# Patient Record
Sex: FEMALE | Race: WHITE | NOT HISPANIC OR LATINO | Employment: UNEMPLOYED | URBAN - METROPOLITAN AREA
[De-identification: names, ages, dates, MRNs, and addresses within clinical notes are randomized per-mention and may not be internally consistent; named-entity substitution may affect disease eponyms.]

---

## 2020-01-22 ENCOUNTER — ANESTHESIA (OUTPATIENT)
Dept: GASTROENTEROLOGY | Facility: HOSPITAL | Age: 55
End: 2020-01-22

## 2020-01-22 ENCOUNTER — ANESTHESIA EVENT (OUTPATIENT)
Dept: GASTROENTEROLOGY | Facility: HOSPITAL | Age: 55
End: 2020-01-22

## 2020-01-22 ENCOUNTER — HOSPITAL ENCOUNTER (EMERGENCY)
Facility: HOSPITAL | Age: 55
Discharge: HOME | End: 2020-01-23
Attending: EMERGENCY MEDICINE | Admitting: INTERNAL MEDICINE

## 2020-01-22 DIAGNOSIS — T18.128A ESOPHAGEAL OBSTRUCTION DUE TO FOOD IMPACTION: Primary | ICD-10-CM

## 2020-01-22 DIAGNOSIS — K22.2 ESOPHAGEAL OBSTRUCTION DUE TO FOOD IMPACTION: Primary | ICD-10-CM

## 2020-01-22 DIAGNOSIS — K22.2 ESOPHAGEAL STRICTURE: ICD-10-CM

## 2020-01-22 PROCEDURE — 25010000002 PROPOFOL 10 MG/ML EMULSION: Performed by: ANESTHESIOLOGY

## 2020-01-22 PROCEDURE — 88305 TISSUE EXAM BY PATHOLOGIST: CPT | Performed by: INTERNAL MEDICINE

## 2020-01-22 PROCEDURE — 25010000002 ONDANSETRON PER 1 MG: Performed by: ANESTHESIOLOGY

## 2020-01-22 PROCEDURE — C1726 CATH, BAL DIL, NON-VASCULAR: HCPCS | Performed by: INTERNAL MEDICINE

## 2020-01-22 PROCEDURE — 25010000003 LIDOCAINE 1 % SOLUTION: Performed by: ANESTHESIOLOGY

## 2020-01-22 PROCEDURE — 43247 EGD REMOVE FOREIGN BODY: CPT | Performed by: INTERNAL MEDICINE

## 2020-01-22 PROCEDURE — 99284 EMERGENCY DEPT VISIT MOD MDM: CPT

## 2020-01-22 PROCEDURE — 25010000002 SUCCINYLCHOLINE PER 20 MG: Performed by: ANESTHESIOLOGY

## 2020-01-22 PROCEDURE — 43249 ESOPH EGD DILATION <30 MM: CPT | Performed by: INTERNAL MEDICINE

## 2020-01-22 PROCEDURE — 25010000002 NEOSTIGMINE 10 MG/10ML SOLUTION: Performed by: ANESTHESIOLOGY

## 2020-01-22 RX ORDER — CETIRIZINE HYDROCHLORIDE 10 MG/1
10 TABLET ORAL DAILY
COMMUNITY

## 2020-01-22 RX ORDER — SODIUM CHLORIDE, SODIUM LACTATE, POTASSIUM CHLORIDE, CALCIUM CHLORIDE 600; 310; 30; 20 MG/100ML; MG/100ML; MG/100ML; MG/100ML
INJECTION, SOLUTION INTRAVENOUS CONTINUOUS PRN
Status: DISCONTINUED | OUTPATIENT
Start: 2020-01-22 | End: 2020-01-22 | Stop reason: SURG

## 2020-01-22 RX ORDER — SUCCINYLCHOLINE CHLORIDE 20 MG/ML
INJECTION INTRAMUSCULAR; INTRAVENOUS AS NEEDED
Status: DISCONTINUED | OUTPATIENT
Start: 2020-01-22 | End: 2020-01-22 | Stop reason: SURG

## 2020-01-22 RX ORDER — LIDOCAINE HYDROCHLORIDE 10 MG/ML
INJECTION, SOLUTION INFILTRATION; PERINEURAL AS NEEDED
Status: DISCONTINUED | OUTPATIENT
Start: 2020-01-22 | End: 2020-01-22 | Stop reason: SURG

## 2020-01-22 RX ORDER — GLYCOPYRROLATE 0.2 MG/ML
INJECTION INTRAMUSCULAR; INTRAVENOUS AS NEEDED
Status: DISCONTINUED | OUTPATIENT
Start: 2020-01-22 | End: 2020-01-22 | Stop reason: SURG

## 2020-01-22 RX ORDER — PROPOFOL 10 MG/ML
VIAL (ML) INTRAVENOUS AS NEEDED
Status: DISCONTINUED | OUTPATIENT
Start: 2020-01-22 | End: 2020-01-22 | Stop reason: SURG

## 2020-01-22 RX ORDER — ONDANSETRON 2 MG/ML
INJECTION INTRAMUSCULAR; INTRAVENOUS AS NEEDED
Status: DISCONTINUED | OUTPATIENT
Start: 2020-01-22 | End: 2020-01-22 | Stop reason: SURG

## 2020-01-22 RX ORDER — NEOSTIGMINE METHYLSULFATE 1 MG/ML
INJECTION, SOLUTION INTRAVENOUS AS NEEDED
Status: DISCONTINUED | OUTPATIENT
Start: 2020-01-22 | End: 2020-01-22 | Stop reason: SURG

## 2020-01-22 RX ORDER — ROCURONIUM BROMIDE 10 MG/ML
INJECTION, SOLUTION INTRAVENOUS AS NEEDED
Status: DISCONTINUED | OUTPATIENT
Start: 2020-01-22 | End: 2020-01-22 | Stop reason: SURG

## 2020-01-22 RX ORDER — LOSARTAN POTASSIUM 25 MG/1
25 TABLET ORAL DAILY
COMMUNITY

## 2020-01-22 RX ADMIN — GLYCOPYRROLATE 0.1 MG: 0.2 INJECTION, SOLUTION INTRAMUSCULAR; INTRAVENOUS at 23:30

## 2020-01-22 RX ADMIN — ONDANSETRON 4 MG: 2 INJECTION INTRAMUSCULAR; INTRAVENOUS at 23:17

## 2020-01-22 RX ADMIN — ROCURONIUM BROMIDE 3 MG: 10 SOLUTION INTRAVENOUS at 23:07

## 2020-01-22 RX ADMIN — SODIUM CHLORIDE, POTASSIUM CHLORIDE, SODIUM LACTATE AND CALCIUM CHLORIDE: 600; 310; 30; 20 INJECTION, SOLUTION INTRAVENOUS at 23:02

## 2020-01-22 RX ADMIN — NEOSTIGMINE METHYLSULFATE 1 MG: 1 INJECTION, SOLUTION INTRAVENOUS at 23:29

## 2020-01-22 RX ADMIN — SUCCINYLCHOLINE CHLORIDE 100 MG: 20 INJECTION, SOLUTION INTRAMUSCULAR; INTRAVENOUS at 23:07

## 2020-01-22 RX ADMIN — PROPOFOL 200 MG: 10 INJECTION, EMULSION INTRAVENOUS at 23:07

## 2020-01-22 RX ADMIN — LIDOCAINE HYDROCHLORIDE 50 MG: 10 INJECTION, SOLUTION INFILTRATION; PERINEURAL at 23:07

## 2020-01-23 VITALS
SYSTOLIC BLOOD PRESSURE: 114 MMHG | HEART RATE: 73 BPM | DIASTOLIC BLOOD PRESSURE: 58 MMHG | HEIGHT: 65 IN | RESPIRATION RATE: 18 BRPM | TEMPERATURE: 98 F | WEIGHT: 180 LBS | OXYGEN SATURATION: 97 % | BODY MASS INDEX: 29.99 KG/M2

## 2020-01-23 PROCEDURE — 25010000002 ONDANSETRON PER 1 MG: Performed by: ANESTHESIOLOGY

## 2020-01-23 RX ORDER — ONDANSETRON 2 MG/ML
4 INJECTION INTRAMUSCULAR; INTRAVENOUS ONCE AS NEEDED
Status: COMPLETED | OUTPATIENT
Start: 2020-01-23 | End: 2020-01-23

## 2020-01-23 RX ADMIN — ONDANSETRON 4 MG: 2 INJECTION INTRAMUSCULAR; INTRAVENOUS at 00:25

## 2020-01-23 NOTE — ANESTHESIA POSTPROCEDURE EVALUATION
Patient: Kalyn Wheeler    Procedure Summary     Date:  01/22/20 Room / Location:   [facility] /  [facility]    Anesthesia Start:  2302 Anesthesia Stop:  2345    Procedure:  ESOPHAGOGASTRODUODENOSCOPY (N/A ) Diagnosis:      Surgeon:  Barrington Kearney MD Provider:  Arthur Freitas Jr., MD    Anesthesia Type:  general ASA Status:  2 - Emergent          Anesthesia Type: general    Vitals  Vitals Value Taken Time   BP     Temp     Pulse 85 1/22/2020 11:44 PM   Resp     SpO2 97 % 1/22/2020 11:44 PM   Vitals shown include unvalidated device data.        Post Anesthesia Care and Evaluation    Patient location during evaluation: PACU  Patient participation: complete - patient participated  Level of consciousness: awake and alert  Pain score: 0  Pain management: adequate  Airway patency: patent  Anesthetic complications: No anesthetic complications  PONV Status: none  Cardiovascular status: hemodynamically stable and acceptable  Respiratory status: nonlabored ventilation, acceptable and nasal cannula  Hydration status: acceptable    Comments: Pt transported to PACU on O2 by NC. Report given to RN at the bedside.

## 2020-01-23 NOTE — CONSULTS
GASTROENTEROLOGY CONSULTATION  Kalyn Wheeler  [id number]  1965    CARE TEAM  Patient Care Team:  Provider, No Known as PCP - General    No ref. provider found     Chief Complaint   Patient presents with   • STEAK IN THROAT        HISTORY OF PRESENT ILLNESS:  Deanna is a very pleasant 54-year-old white female with a past medical history of esophageal food impaction in the summer 2018 which required endoscopic disimpaction.  She started on a proton pump inhibitor and about a month later underwent EGD with esophageal dilation.  She was traveling at that time of the original impaction and that disimpaction was in [facility] but then esophageal dilation was about a month later at home and [place] where she resides.    She is here in [place] trying to arrange for her son's upcoming wedding reception.  She had been to one restaurant and tried steak and then went to [employer] restaurant here in [facility] and either ate a piece of bread by or delay and after that was unable to swallow saliva presented to the emergency department for further work-up and evaluation denying chest pain wheezing, voice hoarseness, shortness of breath odynophagia, early satiety or unexplained weight loss.  She does state that long-term, she denies any problems with chronic reflux but she does state that she has learned to chew carefully, eat slowly and drink a lot of water with her meals to avoid sensation of food impaction.    She is up-to-date on her colon cancer screening stating she had one about 2 years ago which the best of her recollection was within normal limits.    We are asked to see her by Dr. Mondragon of the emergency department for further work-up and evaluation of her food impaction.    Addendum: Patient was able to pull up her old endoscopy report indicating that she was found to have a distal esophageal stricture and that it was dilated to 18 mm.    PAST MEDICAL HISTORY  Past Medical History:   Diagnosis Date   • Esophageal  "obstruction due to food impaction    • Hypertension         PAST SURGICAL HISTORY  Past Surgical History:   Procedure Laterality Date   • APPENDECTOMY     • CHOLECYSTECTOMY     • ESOPHAGEAL DILATATION          MEDICATIONS:  No current facility-administered medications for this encounter.     Current Outpatient Medications:   •  cetirizine (zyrTEC) 10 MG tablet, Take 10 mg by mouth Daily., Disp: , Rfl:   •  losartan (COZAAR) 25 MG tablet, Take 25 mg by mouth Daily., Disp: , Rfl:   •  Multiple Vitamins-Minerals (MULTIVITAMIN ADULT PO), Take  by mouth., Disp: , Rfl:     ALLERGIES  Allergies   Allergen Reactions   • Penicillins Rash       FAMILY HISTORY:  History reviewed. No pertinent family history.    SOCIAL HISTORY  Social History     Socioeconomic History   • Marital status:      Spouse name: Not on file   • Number of children: Not on file   • Years of education: Not on file   • Highest education level: Not on file   Tobacco Use   • Smoking status: Never Smoker   • Smokeless tobacco: Never Used   Substance and Sexual Activity   • Alcohol use: Yes     Comment: OCCASIONAL   • Drug use: Never     Socioeconomic History:  She is  and has 2 sons ages 24 and 18.  She is a stay-at-home mom.  She occasionally drinks alcoholic beverages and is a non-smoker.       REVIEW OF SYSTEMS  Review of Systems  Generalized, HEENT, cardiovascular, pulmonary, gastrointestinal, genitourinary, neurological and musculoskeletal skin review of systems are generally unremarkable except as noted above.    PHYSICAL EXAM   /95 (BP Location: Left arm, Patient Position: Sitting)   Pulse 104   Temp 99 °F (37.2 °C) (Oral)   Resp 18   Ht 165.1 cm (65\")   Wt 81.6 kg (180 lb)   SpO2 96%   BMI 29.95 kg/m²   General: Alert and oriented x 3. In no apparent or acute distress.  and No stigmata of chronic liver disease.  She is spitting up saliva and has an emesis bag.  She is in no apparent distress and is in good spirits.  HEENT: " Anicteric slcera. Normal oropharynx  Neck: Supple. Without lymphadenopathy  CV: Regular rate and rhythm, S1, S2  Lungs: Clear to ausculation. Without rales, robchi and wheezing  Abdomen:  Soft,non-distended without palpable masses or hepatosplenomeagaly, areas of rebound tenderness or guarding.   Extremeties: without clubbing, cyanosis or edema  Neurologic:  Alert and oriented x 3 without focal motor or sensory deficits  Rectal exam: deferred     No results found for this or any previous visit.     Results Review:  I reviewed the patient's new clinical results.      ASSESSMENT  1.-History of peptic esophageal stricture with history of food impactions in the past who presents with acute food impaction.  She likely has either peptic esophageal stricture or eosinophilic esophagitis.  Upper endoscopy is offered.  She understands risk of esophageal perforation, bleeding, cardiorespiratory compromise and wishes to proceed.  Ample opportunity for question was provided with further recommendation deferred pending findings of her upper endoscopy.  2.-Hypertension    PLAN  1.-Proceed with EGD with disimpaction esophagus and likely esophageal dilation if appropriate.      I discussed the patients findings and my recommendations with patient    Barrington Kearney MD  1/22/2020   10:39 PM    Much of this note is an electronic transcription of spoken language to printed text. Electronic transcription of spoken language may permit erroneous, nonsensical word phrases to be inadvertently transcribed.  Although I have reviewed the note for these errors, some may still be present.

## 2020-01-23 NOTE — ED PROVIDER NOTES
"Julien Wheeler is a 54 y.o female who presents to the ED with complaints of an airway obstruction. The patient reports while taste testing for a wedding party tonight she got a piece of steak stuck in her esophagus. She has been experiencing difficulty swallowing alongside a cough since this time. She also had an episode of vomiting while in her hotel room with no relief. The patient states this is a recurrent issue with her last occurrence being a few years ago. She had surgery to \"stretch\" her esophagus secondary to this 2 years ago and has not had any complications until today. No shortness of breath. There are no other acute symptoms at this time.      Airway Obstruction   The current episode started 1 to 2 hours ago. The foreign body is suspected to be swallowed. The foreign body is food. The incident was witnessed. Associated symptoms include trouble swallowing, cough and vomiting. Pertinent negatives include no difficulty breathing and no choking. She has been behaving normally. Associated medical issues include prior foreign body removal.       Review of Systems   HENT: Positive for trouble swallowing.    Respiratory: Positive for cough. Negative for choking and shortness of breath.    Gastrointestinal: Positive for vomiting.   All other systems reviewed and are negative.      Past Medical History:   Diagnosis Date   • Esophageal obstruction due to food impaction    • Hypertension        Allergies   Allergen Reactions   • Penicillins Rash       Past Surgical History:   Procedure Laterality Date   • APPENDECTOMY     • CHOLECYSTECTOMY     • ESOPHAGEAL DILATATION         History reviewed. No pertinent family history.    Social History     Socioeconomic History   • Marital status:      Spouse name: Not on file   • Number of children: Not on file   • Years of education: Not on file   • Highest education level: Not on file   Tobacco Use   • Smoking status: Never Smoker   • Smokeless tobacco: Never " Used   Substance and Sexual Activity   • Alcohol use: Yes     Comment: OCCASIONAL   • Drug use: Never         Objective   Physical Exam   Constitutional: She is oriented to person, place, and time. She appears well-developed and well-nourished. No distress.   HENT:   Head: Normocephalic and atraumatic.   Nose: Nose normal.   Eyes: Conjunctivae are normal. No scleral icterus.   Neck: Normal range of motion. Neck supple.   Cardiovascular: Normal rate, regular rhythm and normal heart sounds.   No murmur heard.  Pulmonary/Chest: Effort normal and breath sounds normal. No respiratory distress.   Abdominal: Soft. There is no tenderness.   Musculoskeletal: Normal range of motion. She exhibits no edema.   Neurological: She is alert and oriented to person, place, and time.   Skin: Skin is warm and dry.   Psychiatric: She has a normal mood and affect. Her behavior is normal.   Nursing note and vitals reviewed.      Procedures         ED Course     I spoke with Dr Kearney and he will come and take her for EGD.  Pt counseled.              MDM  Number of Diagnoses or Management Options  Esophageal obstruction due to food impaction:      Amount and/or Complexity of Data Reviewed  Discuss the patient with other providers: yes        Final diagnoses:   Esophageal obstruction due to food impaction       Documentation assistance provided by racheal Washington.  Information recorded by the scribe was done at my direction and has been verified and validated by me.     Jabari Washington  01/22/20 1079       Wilfredo Mondragon MD  01/23/20 0037

## 2020-01-23 NOTE — ANESTHESIA PROCEDURE NOTES
Airway  Urgency: elective    Date/Time: 1/22/2020 11:09 PM  Airway not difficult    General Information and Staff    Patient location during procedure: OR  Anesthesiologist: Arthur Freitas Jr., MD    Indications and Patient Condition  Indications for airway management: airway protection    Preoxygenated: yes  MILS not maintained throughout  Mask difficulty assessment: 0 - not attempted    Final Airway Details  Final airway type: endotracheal airway      Successful airway: ETT  Cuffed: yes   Successful intubation technique: direct laryngoscopy  Endotracheal tube insertion site: oral  Blade: Prince  Blade size: 3  ETT size (mm): 7.0  Cormack-Lehane Classification: grade I - full view of glottis  Placement verified by: chest auscultation and capnometry   Measured from: lips  ETT/EBT  to lips (cm): 22  Number of attempts at approach: 1  Assessment: lips, teeth, and gum same as pre-op and atraumatic intubation    Additional Comments  RSI with cricoid pressure. Negative epigastric sounds, Breath sound equal bilaterally with symmetric chest rise and fall

## 2020-01-24 LAB
CYTO UR: NORMAL
LAB AP CASE REPORT: NORMAL
LAB AP CLINICAL INFORMATION: NORMAL
PATH REPORT.FINAL DX SPEC: NORMAL
PATH REPORT.GROSS SPEC: NORMAL

## 2024-03-06 NOTE — OP NOTE
See complete EGD report    Distal esophageal food bolus was removed with a Centeno net and subsequent esophageal dilation of distal esophageal stricture was accomplished with 18 to 20 mm through-the-scope balloon dilator with interval assessment between sizes.    Stomach and duodenum were otherwise unremarkable.  Esophageal biopsies were taken to rule out eosinophilia    Findings were reviewed with patient sign    Recommendation  Clear liquids  Omeprazole 20 mg p.o. twice daily over-the-counter  Patient to seek follow-up with gastroenterologist near her home in [place] for follow-up  Patient to return to emergency department or call me should signs or symptoms of any complications develop.  (Signs and symptoms were reviewed)   drink in college since then haven touch a drink/Never

## (undated) DEVICE — ENDOGATOR HYBRID TUBING KIT FOR USE WITH ENDOGATOR IRRIGATION PUMP, OLYMPUS PUMP, GI4000 ESU, AND TORRENT IRRIGATION PUMP.: Brand: ENDOGATOR KIT

## (undated) DEVICE — "MH-443 SUCTION VALVE F/EVIS140 EVIS160": Brand: SUCTION VALVE

## (undated) DEVICE — "MH-438 A/W VLVE F/140 EVIS-140": Brand: AIR/WATER VALVE

## (undated) DEVICE — THE BITE BLOCK MAXI, LATEX FREE STRAP IS USED TO PROTECT THE ENDOSCOPE INSERTION TUBE FROM BEING BITTEN BY THE PATIENT.

## (undated) DEVICE — Device: Brand: AIR/WATER CHANNEL CLEANING ADAPTER

## (undated) DEVICE — ESOPHAGEAL WIREGUIDED BALLOON DILATATION CATHETER: Brand: CRE WIREGUIDED

## (undated) DEVICE — CONTN GRAD MEAS TRIANG 32OZ BLK

## (undated) DEVICE — SYR LUERLOK 50ML

## (undated) DEVICE — DEV INFL ALLIANCE2 SYS

## (undated) DEVICE — TUBING,OXYGEN,CRUSH RES,7',CLEAR,UC: Brand: MEDLINE INDUSTRIES, INC.

## (undated) DEVICE — THE DISPOSABLE ROTH NET FOREIGN BODY STANDARD RETRIEVAL DEVICE IS USED IN THE ENDOSCOPIC RETRIEVAL OF FOREIGN BODY, FOOD BOLUS AND EXCISED TISSUE SUCH AS POLYPS.: Brand: ROTH NET

## (undated) DEVICE — SINGLE-USE BIOPSY FORCEPS: Brand: RADIAL JAW 4